# Patient Record
(demographics unavailable — no encounter records)

---

## 2024-10-29 NOTE — PHYSICAL EXAM
[No Acute Distress] : no acute distress [Normal Sclera/Conjunctiva] : normal sclera/conjunctiva [Normal Outer Ear/Nose] : the outer ears and nose were normal in appearance [No JVD] : no jugular venous distention [Normal] : normal rate, regular rhythm, normal S1 and S2 and no murmur heard [No Edema] : there was no peripheral edema [No Rash] : no rash [No Focal Deficits] : no focal deficits

## 2024-11-03 NOTE — END OF VISIT
[] : Resident [FreeTextEntry3] : pt to f/u with pulm for sleep study. insomnia mgmt as above. cough less likely related to pt pulm hx, will address GERD/post nasal drip related causes of cough. pt seen by ED and UC for cough and given extensive meds, pt counseled to hold/discontinue steroids.  [Time Spent: ___ minutes] : I have spent [unfilled] minutes of time on the encounter which excludes teaching and separately reported services.

## 2024-11-03 NOTE — HISTORY OF PRESENT ILLNESS
[FreeTextEntry8] : 39F pmhx seizures requiring intubation and trach s/p decannulation (2011) presents with 3 weeks of insomnia and 1 week of cough. Patient is most worried about the insomnia. For insomnia, patient has had difficulty with sleep onset x3 days. No known triggering factor. Patient tries to sleep at 10pm but is unable to sleep for several hours and wakes up at 5 or 6am. States she sleeps 3 hours per night and takes a 3 hour nap during the day while her children are at school. States she avoids stimulating activities such as phone and blue light prior to bed. Inquires about sleep apnea; states she has day time somnolence and snoring, but denies observed periods of apnea. For cough, patient reports cough is nonproductive. No reported fevers. Went to ED 10/25, where RVP and CXR were negative. Went to urgent care prior to clinic visit and picked up prednisone, azithro, tessalon perles, zofran, and flonase from pharmacy as prescribed by urgent care. Overall, cough has been improving.

## 2024-11-03 NOTE — ASSESSMENT
[FreeTextEntry1] : 39F pmhx seizures requirng intubation and trach s/p decannulation (2011) presents with 3 weeks of insomnia and 1 week of cough.  D/w Dr. Holden  RTC w/ PCP DEBBIE Nava 4weeks for insomnia  Rafael Vargas PGY-2 [employer]

## 2024-11-03 NOTE — REVIEW OF SYSTEMS
[Cough] : cough [Fever] : no fever [Chest Pain] : no chest pain [Shortness Of Breath] : no shortness of breath [Wheezing] : no wheezing

## 2024-12-02 NOTE — HISTORY OF PRESENT ILLNESS
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 Pt endorsed new onset headache last Monday 11/4 that worsened throughout the week, until Thursday when she came into the ED. Endorses 10/10 pain. Additionally, pt was experiencing vomiting and nausea. Pt was worked up and was dc from ED with Trazdone, Zolpidem, and Zofran PRNs for sleep and nausea. On Thursday after dc from ED, pt started experiencing worsening R eye swelling, vision blurriness, and rash. Pt returned to the ED yesterday fro worsening symptoms. Pt is no longer vomitting, but endorses continued headache and eye symptoms. Pt took acetaminophen and tylenol for pain management, to no marked effect. Endorses persistent loss of appetite. Pt is sexually active with one lifetime partner. Pt denies dysuria, hematuria, GI/ symptoms.   PER ED NOTE -- 39-year-old female distant history of encephalitis, no other medical history, presents to the ER with pain of the right eye swelling of the right upper eyelid and a rash developing around that area as well. No fevers or chills. She has had a persistent headache for over a week now. She was in the ER 3 days ago where the rash and swelling or eye complaint was not present. At that time she had blood work done and a CT head which was negative. She was discharged on zolpidem to help with her sleep tramadol for pain as well as Zofran for nausea. She was vomiting last week with a headache but she has stopped vomiting and she only has nausea now. She wears glasses at baseline. No loss of visual acuity. No sick contacts at home. Childhood vaccines are up-to-date. No confusion. (11 Nov 2024 12:48)  Hospital Course: Over course of hospitalization, pt's right eye swelling, vesicular rash, skin erythema, and amount of eye discharge improved significantly. However, patient's eye was swollen shut for entire hospitalization. Eye redness also moderately improved. Patient endorsed blurry vision from right eye that improved by the day of discharge. Pt endorsed some anxiety related to symptoms of mild tearing, swelling, and itchiness in from her left eye. However, there was never any clinically significant signs that infections had spread to her left eye. At points, pt also endorsed chest pain that improved without treatment, and throat pain present when swallowing, but not present when eating. On day of dc, vesicular rash had fully crusted over. Right eye was still swollen shut, but swelling had markedly decreased, right eye itself was still moderately erythematous.  The patient is afebrile, hemodynamically stable and medically optimized for discharge home, to follow-up with ophthalmology. On day of discharge, patient is clinically stable with no new exam findings or acute symptoms compared to prior. The patient was seen by the attending physician on the date of discharge and deemed stable and acceptable for discharge. The patient's medication reconciliation (with changes made to chronic medications), follow up appointments, discharge orders, instructions, and significant lab and diagnostic studies are as noted.  Important Medication Changes and Reason: None  Active or Pending Issues Requiring Follow-up: None  Advanced Directives: [X] Full code [ ] DNR [ ] Hospice  Discharge Diagnoses: Disseminated Herpes Zoster Ophthalmicus  Med Reconciliation: Override IMPROVE-DD recommendations due to: IMPROVE-DD Application Not Available Recommended Post-Discharge VTE Prophylaxis IMPROVE-DD Application Not Available Medication Reconciliation Status Admission Reconciliation is Completed Discharge Reconciliation is Completed  Discharge Medications erythromycin 0.5% ophthalmic ointment: 1 application to each affected eye 2 times a day meloxicam 10 mg oral capsule: 1 cap(s) orally once a day ocular lubricant ophthalmic solution: 1 drop(s) to each affected eye every 4 hours valACYclovir 1 g oral tablet: 1 tab(s) orally every 8 hours   Pt is a 40 y/o old female w/PMHx of seizure and prior intubation and tracheostomy in 2011, Ventral hernia (following surgery) who presents today for a post hospital follow up.  Patient was admitted to [facility] for Disseminated Herpes Zoster Ophthalmicus. s/p treatment.  Today the patient reports that she still has pain in her right eye. She has completed the course of Valacyclovir. Patient has an appointment with the ophthalmology next week.   # Insomnia- Patient also reports that she has trouble falling asleep. She denies anxiety/depression/denies any stress, she denies taking daytime nap, denies screen time at night. She has tried melatonin, but it does not help. Will provide CBT referral today.

## 2024-12-02 NOTE — PHYSICAL EXAM
[Normal] : no acute distress, well nourished, well developed and well-appearing [No Respiratory Distress] : no respiratory distress  [No Accessory Muscle Use] : no accessory muscle use [Clear to Auscultation] : lungs were clear to auscultation bilaterally [Normal Rate] : normal rate  [Regular Rhythm] : with a regular rhythm [Normal S1, S2] : normal S1 and S2 [de-identified] : unable to open right eye

## 2024-12-02 NOTE — HISTORY OF PRESENT ILLNESS
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 Pt endorsed new onset headache last Monday 11/4 that worsened throughout the week, until Thursday when she came into the ED. Endorses 10/10 pain. Additionally, pt was experiencing vomiting and nausea. Pt was worked up and was dc from ED with Trazdone, Zolpidem, and Zofran PRNs for sleep and nausea. On Thursday after dc from ED, pt started experiencing worsening R eye swelling, vision blurriness, and rash. Pt returned to the ED yesterday fro worsening symptoms. Pt is no longer vomitting, but endorses continued headache and eye symptoms. Pt took acetaminophen and tylenol for pain management, to no marked effect. Endorses persistent loss of appetite. Pt is sexually active with one lifetime partner. Pt denies dysuria, hematuria, GI/ symptoms.   PER ED NOTE -- 39-year-old female distant history of encephalitis, no other medical history, presents to the ER with pain of the right eye swelling of the right upper eyelid and a rash developing around that area as well. No fevers or chills. She has had a persistent headache for over a week now. She was in the ER 3 days ago where the rash and swelling or eye complaint was not present. At that time she had blood work done and a CT head which was negative. She was discharged on zolpidem to help with her sleep tramadol for pain as well as Zofran for nausea. She was vomiting last week with a headache but she has stopped vomiting and she only has nausea now. She wears glasses at baseline. No loss of visual acuity. No sick contacts at home. Childhood vaccines are up-to-date. No confusion. (11 Nov 2024 12:48)  Hospital Course: Over course of hospitalization, pt's right eye swelling, vesicular rash, skin erythema, and amount of eye discharge improved significantly. However, patient's eye was swollen shut for entire hospitalization. Eye redness also moderately improved. Patient endorsed blurry vision from right eye that improved by the day of discharge. Pt endorsed some anxiety related to symptoms of mild tearing, swelling, and itchiness in from her left eye. However, there was never any clinically significant signs that infections had spread to her left eye. At points, pt also endorsed chest pain that improved without treatment, and throat pain present when swallowing, but not present when eating. On day of dc, vesicular rash had fully crusted over. Right eye was still swollen shut, but swelling had markedly decreased, right eye itself was still moderately erythematous.  The patient is afebrile, hemodynamically stable and medically optimized for discharge home, to follow-up with ophthalmology. On day of discharge, patient is clinically stable with no new exam findings or acute symptoms compared to prior. The patient was seen by the attending physician on the date of discharge and deemed stable and acceptable for discharge. The patient's medication reconciliation (with changes made to chronic medications), follow up appointments, discharge orders, instructions, and significant lab and diagnostic studies are as noted.  Important Medication Changes and Reason: None  Active or Pending Issues Requiring Follow-up: None  Advanced Directives: [X] Full code [ ] DNR [ ] Hospice  Discharge Diagnoses: Disseminated Herpes Zoster Ophthalmicus  Med Reconciliation: Override IMPROVE-DD recommendations due to: IMPROVE-DD Application Not Available Recommended Post-Discharge VTE Prophylaxis IMPROVE-DD Application Not Available Medication Reconciliation Status Admission Reconciliation is Completed Discharge Reconciliation is Completed  Discharge Medications erythromycin 0.5% ophthalmic ointment: 1 application to each affected eye 2 times a day meloxicam 10 mg oral capsule: 1 cap(s) orally once a day ocular lubricant ophthalmic solution: 1 drop(s) to each affected eye every 4 hours valACYclovir 1 g oral tablet: 1 tab(s) orally every 8 hours   Pt is a 40 y/o old female w/PMHx of seizure and prior intubation and tracheostomy in 2011, Ventral hernia (following surgery) who presents today for a post hospital follow up.  Patient was admitted to [facility] for Disseminated Herpes Zoster Ophthalmicus. s/p treatment.  Today the patient reports that she still has pain in her right eye. She has completed the course of Valacyclovir. Patient has an appointment with the ophthalmology next week.   # Insomnia- Patient also reports that she has trouble falling asleep. She denies anxiety/depression/denies any stress, she denies taking daytime nap, denies screen time at night. She has tried melatonin, but it does not help. Will provide CBT referral today.

## 2024-12-02 NOTE — PHYSICAL EXAM
[Normal] : no acute distress, well nourished, well developed and well-appearing [No Respiratory Distress] : no respiratory distress  [No Accessory Muscle Use] : no accessory muscle use [Clear to Auscultation] : lungs were clear to auscultation bilaterally [Normal Rate] : normal rate  [Regular Rhythm] : with a regular rhythm [Normal S1, S2] : normal S1 and S2 [de-identified] : unable to open right eye

## 2024-12-17 NOTE — HISTORY OF PRESENT ILLNESS
[FreeTextEntry1] : 38 yo F presents for initial evaluation of acute insomnia. Referred by NP Dale Nava PMH: unspecified possible viral encephalitis in 2011 at [facility] with prolonged AMS and trach/decannulated Sleep history: In the past sleep from 12 am to 7 am, no issues falling asleep or staying asleep, takes kid to school at 9 am.  Insomnia for the past 1.5 months. Denies acute stressors and can not identify a precipitating factor. denies previous insomnia episodes. Now gets into bed at 10 pm, tired but can not sleep for hours, listens to relaxing music, counting, laying in bed the whole time, will be awake until 5 am.  Snores, denies gasping episodes.  Has tried to nap during the day, has difficulty. ESS 3 Has tried Melatonin, Unisom and Tylenol PM- didn't help.  4 children 18, 16, 13, and 7 Manages the children primarily as her  works during the day  Recent hospitalization for herpes ophthalmicus - discharged last month 11/16. Continues to have pain over her right eye/periorbital/face. Prescribed Gabapentin 300 mg and Duloxetine - not taking either as concerned about potential side effects.   The uncontrolled pain has made her insomnia worse.

## 2024-12-17 NOTE — ASSESSMENT
[FreeTextEntry1] : 38 yo F presents for initial evaluation of acute insomnia. Referred by NP Dale Nava PMH: unspecified possible viral encephalitis in 2011 at [facility] with prolonged AMS and trach/decannulated Sleep history: In the past sleep from 12 am to 7 am, no issues falling asleep or staying asleep, takes kid to school at 9 am. Insomnia for the past 1.5 months. Denies acute stressors and can not identify a precipitating factor. denies previous insomnia episodes. Now gets into bed at 10 pm, tired but can not sleep for hours, listens to relaxing music, counting, laying in bed the whole time, will be awake until 5 am. Snores, denies gasping episodes. Has tried to nap during the day, has difficulty. ESS 3 Has tried Melatonin, Unisom and Tylenol PM- didn't help. 4 children 18, 16, 13, and 7 Manages the children primarily as her  works during the day  Recent hospitalization for herpes ophthalmicus - discharged last month 11/16. Continues to have pain over her right eye/periorbital/face. Prescribed Gabapentin 300 mg and Duloxetine - not taking either as concerned about potential side effects. The uncontrolled pain has made her insomnia worse.  A/Plan: Acute insomnia, unclear precipitator, made worse by recent hospitalization and uncontrolled neuropathic pain Discussed Gabapentin use and general safety profile as well as the fact that if her pain is controlled, she will sleep better and Gabapentin can have sedating effects as well that can promote sleep. Discussed starting at a lower dose (100 mg) at bedtime to trial and see how she feels. She will consider this As an alternative, discussed initiating low dose Zolpidem 5 mg. Denies a history of parasomnias. She is amenable to this.  Instructed not to combine with gabapentin, alcohol or to perform complex activities when she is taking the medication.  Instructed to take about 30 minutes prior to going to bed.  She appears to understand.  Rx sent to her pharmacy. Follow-up in 3 to 4 weeks

## 2024-12-17 NOTE — ASSESSMENT
[FreeTextEntry1] : 38 yo F presents for initial evaluation of acute insomnia. Referred by NP Dale Nava PMH: unspecified possible viral encephalitis in 2011 at [facility] with prolonged AMS and trach/decannulated Sleep history: In the past sleep from 12 am to 7 am, no issues falling asleep or staying asleep, takes kid to school at 9 am. Insomnia for the past 1.5 months. Denies acute stressors and can not identify a precipitating factor. denies previous insomnia episodes. Now gets into bed at 10 pm, tired but can not sleep for hours, listens to relaxing music, counting, laying in bed the whole time, will be awake until 5 am. Snores, denies gasping episodes. Has tried to nap during the day, has difficulty. ESS 3 Has tried Melatonin, Unisom and Tylenol PM- didn't help. 4 children 18, 16, 13, and 7 Manages the children primarily as her  works during the day  Recent hospitalization for herpes ophthalmicus - discharged last month 11/16. Continues to have pain over her right eye/periorbital/face. Prescribed Gabapentin 300 mg and Duloxetine - not taking either as concerned about potential side effects. The uncontrolled pain has made her insomnia worse.  A/Plan: Acute insomnia, unclear precipitator, made worse by recent hospitalization and uncontrolled neuropathic pain Discussed Gabapentin use and general safety profile as well as the fact that if her pain is controlled, she will sleep better and Gabapentin can have sedating effects as well that can promote sleep. Discussed starting at a lower dose (100 mg) at bedtime to trial and see how she feels. She will consider this As an alternative, discussed initiating low dose Zolpidem 5 mg. Denies a history of parasomnias. She is amenable to this.  Instructed not to combine with gabapentin, alcohol or to perform complex activities when she is taking the medication.  Instructed to take about 30 minutes prior to going to bed.  She appears to understand.  Rx sent to her pharmacy. Follow-up in 3 to 4 weeks

## 2024-12-17 NOTE — REVIEW OF SYSTEMS
[Snoring] : snoring [Witnessed Apneas] : no witnessed apnea [Nocturia] : no nocturia [Difficulty Initiating Sleep] : difficulty falling asleep [Difficulty Maintaining Sleep] : no difficulty maintaining sleep [Acute Insomnia] : acute insomnia [Lower Extremity Discomfort] : no lower extremity discomfort [Unusual Sleep Behavior] : no unusual sleep behavior [Hypersomnolence] : not sleeping much more than usual [Negative] : Endocrine [FreeTextEntry3] : per hpi [de-identified] : per hpi

## 2024-12-17 NOTE — HISTORY OF PRESENT ILLNESS
[FreeTextEntry1] : 40 yo F presents for initial evaluation of acute insomnia. Referred by NP Dale Nava PMH: unspecified possible viral encephalitis in 2011 at [facility] with prolonged AMS and trach/decannulated Sleep history: In the past sleep from 12 am to 7 am, no issues falling asleep or staying asleep, takes kid to school at 9 am.  Insomnia for the past 1.5 months. Denies acute stressors and can not identify a precipitating factor. denies previous insomnia episodes. Now gets into bed at 10 pm, tired but can not sleep for hours, listens to relaxing music, counting, laying in bed the whole time, will be awake until 5 am.  Snores, denies gasping episodes.  Has tried to nap during the day, has difficulty. ESS 3 Has tried Melatonin, Unisom and Tylenol PM- didn't help.  4 children 18, 16, 13, and 7 Manages the children primarily as her  works during the day  Recent hospitalization for herpes ophthalmicus - discharged last month 11/16. Continues to have pain over her right eye/periorbital/face. Prescribed Gabapentin 300 mg and Duloxetine - not taking either as concerned about potential side effects.   The uncontrolled pain has made her insomnia worse.

## 2024-12-17 NOTE — PHYSICAL EXAM
[General Appearance - Well Developed] : well developed [Normal Appearance] : normal appearance [Well Groomed] : well groomed [General Appearance - Well Nourished] : well nourished [No Deformities] : no deformities [General Appearance - In No Acute Distress] : no acute distress [Normal Conjunctiva] : the conjunctiva exhibited no abnormalities [Eyelids - No Xanthelasma] : the eyelids demonstrated no xanthelasmas [Low Lying Soft Palate] : low lying soft palate [II] : II [Neck Appearance] : the appearance of the neck was normal [Heart Rate And Rhythm] : heart rate was normal and rhythm regular [Heart Sounds] : normal S1 and S2 [] : no respiratory distress [Respiration, Rhythm And Depth] : normal respiratory rhythm and effort [Auscultation Breath Sounds / Voice Sounds] : lungs were clear to auscultation bilaterally [Motor Tone] : muscle strength and tone were normal [Nail Clubbing] : no clubbing of the fingernails [Cyanosis, Localized] : no localized cyanosis [Skin Color & Pigmentation] : normal skin color and pigmentation [Oriented To Time, Place, And Person] : oriented to person, place, and time [Impaired Insight] : insight and judgment were intact

## 2024-12-17 NOTE — REVIEW OF SYSTEMS
[Snoring] : snoring [Witnessed Apneas] : no witnessed apnea [Nocturia] : no nocturia [Difficulty Initiating Sleep] : difficulty falling asleep [Difficulty Maintaining Sleep] : no difficulty maintaining sleep [Acute Insomnia] : acute insomnia [Lower Extremity Discomfort] : no lower extremity discomfort [Unusual Sleep Behavior] : no unusual sleep behavior [Hypersomnolence] : not sleeping much more than usual [Negative] : Endocrine [FreeTextEntry3] : per hpi [de-identified] : per hpi

## 2024-12-17 NOTE — CONSULT LETTER
[Dear  ___] : Dear ~OVIDIO, [Consult Letter:] : I had the pleasure of evaluating your patient, [unfilled]. [Please see my note below.] : Please see my note below. [Consult Closing:] : Thank you very much for allowing me to participate in the care of this patient.  If you have any questions, please do not hesitate to contact me. [Sincerely,] : Sincerely, [FreeTextEntry2] : DEBBIE Nava [FreeTextEntry3] : Kassandra Macedo MD, FAASM  of Medicine Associate , Fellowship in Pulmonary and Critical Care Medicine Division of Pulmonary, Critical Care & Sleep Medicine [employer] [facility] [facility] [employer] [facility]

## 2025-01-16 NOTE — ASSESSMENT
[FreeTextEntry1] : 40 yo F with chronic insomnia, recent herpes ophthalmicus with significant residual neuropathic pain who presents for follow up. Plan: As she continues to have significant eye discomfort at night, will increase Gabapentin to 400 mg and can increase to max of 600 mg. Rx for 100 mg tabs sent.  Does not wish to take Gabapentin during the day Instructed not to combine Gabapentin with Zolpidem. Follow up in 6 weeks or earlier as needed

## 2025-01-16 NOTE — ASSESSMENT
[FreeTextEntry1] : 38 yo F with chronic insomnia, recent herpes ophthalmicus with significant residual neuropathic pain who presents for follow up. Plan: As she continues to have significant eye discomfort at night, will increase Gabapentin to 400 mg and can increase to max of 600 mg. Rx for 100 mg tabs sent.  Does not wish to take Gabapentin during the day Instructed not to combine Gabapentin with Zolpidem. Follow up in 6 weeks or earlier as needed

## 2025-01-16 NOTE — HISTORY OF PRESENT ILLNESS
[FreeTextEntry1] : 38 yo F with chronic insomnia presents for follow up. 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 Referred by DEBBIE Nava Sleep history: In the past sleep from 12 am to 7 am, no issues falling asleep or staying asleep, takes kid to school at 9 am.  Insomnia for the past 1.5 months. Denies acute stressors and can not identify a precipitating factor. denies previous insomnia episodes. Now gets into bed at 10 pm, tired but can not sleep for hours, listens to relaxing music, counting, laying in bed the whole time, will be awake until 5 am.  Snores, denies gasping episodes.  Has tried to nap during the day, has difficulty. ESS 3 Has tried Melatonin, Unisom and Tylenol PM- didn't help.  4 children 18, 16, 13, and 7 Manages the children primarily as her  works during the day  Recent hospitalization for herpes ophthalmicus - discharged last month 11/16. Continues to have pain over her right eye/periorbital/face. Prescribed Gabapentin 300 mg and Duloxetine - not taking either as concerned about potential side effects.   The uncontrolled pain has made her insomnia worse.   Follow up televisit 1/16/25: At last visit was started on Zolpidem 7.5 mg at bedtime (after updating me that she had taken Ambien 5 mg in the past without benefit)- did not start as she continues to use Gabapentin Using Gabapentin 300 mg at 8-9 pm, falls asleep around 10-11 pm - worked for pain and helped her sleep, then after a few nights unable to sleep or in a very light sleep. Using for about 1 month

## 2025-01-16 NOTE — REASON FOR VISIT
[Home] : at home, [unfilled] , at the time of the visit. [Medical Office: ([facility])___] : at the medical office located in  [Follow-Up] : a follow-up visit [FreeTextEntry2] : Chronic insomnia

## 2025-01-16 NOTE — PHYSICAL EXAM
[General Appearance - Well Developed] : well developed [Normal Appearance] : normal appearance [Well Groomed] : well groomed [General Appearance - Well Nourished] : well nourished [No Deformities] : no deformities [General Appearance - In No Acute Distress] : no acute distress [Normal Conjunctiva] : the conjunctiva exhibited no abnormalities [Eyelids - No Xanthelasma] : the eyelids demonstrated no xanthelasmas [Normal Oropharynx] : normal oropharynx [FreeTextEntry1] : healed tracheostomy site [Heart Rate And Rhythm] : heart rate was normal and rhythm regular [Heart Sounds] : normal S1 and S2 [] : no respiratory distress [Respiration, Rhythm And Depth] : normal respiratory rhythm and effort [Auscultation Breath Sounds / Voice Sounds] : lungs were clear to auscultation bilaterally [Nail Clubbing] : no clubbing of the fingernails [Cyanosis, Localized] : no localized cyanosis [Oriented To Time, Place, And Person] : oriented to person, place, and time [Impaired Insight] : insight and judgment were intact

## 2025-01-16 NOTE — REASON FOR VISIT
[Home] : at home, [unfilled] , at the time of the visit. [Medical Office: ([facility])___] : at the medical office located in  [Follow-Up] : a follow-up visit [FreeTextEntry2] : Chronic insomnia

## 2025-01-16 NOTE — HISTORY OF PRESENT ILLNESS
[FreeTextEntry1] : 38 yo F with chronic insomnia presents for follow up. PMH: unspecified possible viral encephalitis in 2011 at [facility] with prolonged AMS and trach/decannulated, herpes ophthalmicus  12/16/24: Initial evaluation of acute insomnia. Referred by DEBBIE Nava Sleep history: In the past sleep from 12 am to 7 am, no issues falling asleep or staying asleep, takes kid to school at 9 am.  Insomnia for the past 1.5 months. Denies acute stressors and can not identify a precipitating factor. denies previous insomnia episodes. Now gets into bed at 10 pm, tired but can not sleep for hours, listens to relaxing music, counting, laying in bed the whole time, will be awake until 5 am.  Snores, denies gasping episodes.  Has tried to nap during the day, has difficulty. ESS 3 Has tried Melatonin, Unisom and Tylenol PM- didn't help.  4 children 18, 16, 13, and 7 Manages the children primarily as her  works during the day  Recent hospitalization for herpes ophthalmicus - discharged last month 11/16. Continues to have pain over her right eye/periorbital/face. Prescribed Gabapentin 300 mg and Duloxetine - not taking either as concerned about potential side effects.   The uncontrolled pain has made her insomnia worse.   Follow up televisit 1/16/25: At last visit was started on Zolpidem 7.5 mg at bedtime (after updating me that she had taken Ambien 5 mg in the past without benefit)- did not start as she continues to use Gabapentin Using Gabapentin 300 mg at 8-9 pm, falls asleep around 10-11 pm - worked for pain and helped her sleep, then after a few nights unable to sleep or in a very light sleep. Using for about 1 month

## 2025-01-16 NOTE — REVIEW OF SYSTEMS
[Snoring] : snoring [Difficulty Initiating Sleep] : difficulty falling asleep [Acute Insomnia] : acute insomnia [Negative] : Endocrine [Witnessed Apneas] : no witnessed apnea [Nocturia] : no nocturia [Difficulty Maintaining Sleep] : no difficulty maintaining sleep [Lower Extremity Discomfort] : no lower extremity discomfort [Unusual Sleep Behavior] : no unusual sleep behavior [Hypersomnolence] : not sleeping much more than usual [FreeTextEntry3] : per hpi [de-identified] : per hpi

## 2025-01-16 NOTE — REVIEW OF SYSTEMS
[Snoring] : snoring [Difficulty Initiating Sleep] : difficulty falling asleep [Acute Insomnia] : acute insomnia [Negative] : Endocrine [Witnessed Apneas] : no witnessed apnea [Nocturia] : no nocturia [Difficulty Maintaining Sleep] : no difficulty maintaining sleep [Lower Extremity Discomfort] : no lower extremity discomfort [Unusual Sleep Behavior] : no unusual sleep behavior [Hypersomnolence] : not sleeping much more than usual [FreeTextEntry3] : per hpi [de-identified] : per hpi

## 2025-03-13 NOTE — HISTORY OF PRESENT ILLNESS
[FreeTextEntry1] : 38 yo F with chronic insomnia presents for follow up. 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 Referred by DEBBIE Nava Sleep history: In the past sleep from 12 am to 7 am, no issues falling asleep or staying asleep, takes kid to school at 9 am.  Insomnia for the past 1.5 months. Denies acute stressors and can not identify a precipitating factor. denies previous insomnia episodes. Now gets into bed at 10 pm, tired but can not sleep for hours, listens to relaxing music, counting, laying in bed the whole time, will be awake until 5 am.  Snores, denies gasping episodes.  Has tried to nap during the day, has difficulty. ESS 3 Has tried Melatonin, Unisom and Tylenol PM- didn't help.  4 children 18, 16, 13, and 7 Manages the children primarily as her  works during the day  Recent hospitalization for herpes ophthalmicus - discharged last month 11/16. Continues to have pain over her right eye/periorbital/face. Prescribed Gabapentin 300 mg and Duloxetine - not taking either as concerned about potential side effects.   The uncontrolled pain has made her insomnia worse.   Follow up televisit 1/16/25: At last visit was started on Zolpidem 7.5 mg at bedtime (after updating me that she had taken Ambien 5 mg in the past without benefit)- did not start as she continues to use Gabapentin Using Gabapentin 300 mg at 8-9 pm, falls asleep around 10-11 pm - worked for pain and helped her sleep, then after a few nights unable to sleep or in a very light sleep. Using for about 1 month  Follow up OV 3/12/25:  States the eye pain is much improved. Continues to take Gabapentin 400 mg at bedtime which helps her sleep.  Continues to have difficulty initiating sleep when she does not take Gabapentin. Feels tired around 8 pm and puts her 7 year old to bed at at 8:30 but when she goes to her bed can not fall asleep until much later, often taking Gabapentin to help her sleep close to 12 am. Up at 6 am, tired during the day, will nap 1 hour

## 2025-03-13 NOTE — REVIEW OF SYSTEMS
[Snoring] : snoring [Difficulty Initiating Sleep] : difficulty falling asleep [Acute Insomnia] : acute insomnia [Negative] : Endocrine [Witnessed Apneas] : no witnessed apnea [Nocturia] : no nocturia [Difficulty Maintaining Sleep] : no difficulty maintaining sleep [Lower Extremity Discomfort] : no lower extremity discomfort [Unusual Sleep Behavior] : no unusual sleep behavior [Hypersomnolence] : not sleeping much more than usual [FreeTextEntry3] : per hpi [de-identified] : per hpi

## 2025-03-13 NOTE — ASSESSMENT
[FreeTextEntry1] : 40 yo F with chronic insomnia, recent herpes ophthalmicus with significant residual neuropathic pain which has now resolved who presents for follow up. Plan: Continues to have sleep initiation insomnia despite resolution of pain. Reviewed sleep hygiene and sleep restriction techniques Will stop Gabapentin and start Ambien 7.5 mg (did not respond to 5 mg in the past)- rx sent Provided with CBTi referral to Dr. Tessy Kramer and I will also request from [facility] CBT program Follow up in 6 weeks

## 2025-03-13 NOTE — PHYSICAL EXAM
[General Appearance - Well Developed] : well developed [Normal Appearance] : normal appearance [Well Groomed] : well groomed [General Appearance - Well Nourished] : well nourished [No Deformities] : no deformities [General Appearance - In No Acute Distress] : no acute distress [Normal Conjunctiva] : the conjunctiva exhibited no abnormalities [Eyelids - No Xanthelasma] : the eyelids demonstrated no xanthelasmas [Normal Oropharynx] : normal oropharynx [Heart Rate And Rhythm] : heart rate was normal and rhythm regular [Heart Sounds] : normal S1 and S2 [] : no respiratory distress [Respiration, Rhythm And Depth] : normal respiratory rhythm and effort [Auscultation Breath Sounds / Voice Sounds] : lungs were clear to auscultation bilaterally [Nail Clubbing] : no clubbing of the fingernails [Cyanosis, Localized] : no localized cyanosis [Oriented To Time, Place, And Person] : oriented to person, place, and time [Impaired Insight] : insight and judgment were intact [FreeTextEntry1] : healed tracheostomy site

## 2025-03-13 NOTE — REVIEW OF SYSTEMS
[Snoring] : snoring [Difficulty Initiating Sleep] : difficulty falling asleep [Acute Insomnia] : acute insomnia [Negative] : Endocrine [Witnessed Apneas] : no witnessed apnea [Nocturia] : no nocturia [Difficulty Maintaining Sleep] : no difficulty maintaining sleep [Lower Extremity Discomfort] : no lower extremity discomfort [Unusual Sleep Behavior] : no unusual sleep behavior [Hypersomnolence] : not sleeping much more than usual [FreeTextEntry3] : per hpi [de-identified] : per hpi

## 2025-03-13 NOTE — ASSESSMENT
[FreeTextEntry1] : 38 yo F with chronic insomnia, recent herpes ophthalmicus with significant residual neuropathic pain which has now resolved who presents for follow up. Plan: Continues to have sleep initiation insomnia despite resolution of pain. Reviewed sleep hygiene and sleep restriction techniques Will stop Gabapentin and start Ambien 7.5 mg (did not respond to 5 mg in the past)- rx sent Provided with CBTi referral to Dr. Tessy Kramer and I will also request from [facility] CBT program Follow up in 6 weeks

## 2025-05-02 NOTE — PHYSICAL EXAM
[No Acute Distress] : no acute distress [Normal Oropharynx] : normal oropharynx [Normal Appearance] : normal appearance [Normal Rate/Rhythm] : normal rate/rhythm [Normal S1, S2] : normal s1, s2 [No Murmurs] : no murmurs [No Resp Distress] : no resp distress [Clear to Auscultation Bilaterally] : clear to auscultation bilaterally [No Abnormalities] : no abnormalities [No Clubbing] : no clubbing [No Cyanosis] : no cyanosis [No Edema] : no edema [Normal Color/ Pigmentation] : normal color/ pigmentation [No Focal Deficits] : no focal deficits [Oriented x3] : oriented x3 [Normal Affect] : normal affect

## 2025-05-08 NOTE — HISTORY OF PRESENT ILLNESS
[Never] : never [TextBox_4] : 41 yo F with chronic insomnia presents for follow up. 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 Referred by DEBBIE Nava Sleep history: In the past sleep from 12 am to 7 am, no issues falling asleep or staying asleep, takes kid to school at 9 am. Insomnia for the past 1.5 months. Denies acute stressors and can not identify a precipitating factor. denies previous insomnia episodes. Now gets into bed at 10 pm, tired but can not sleep for hours, listens to relaxing music, counting, laying in bed the whole time, will be awake until 5 am. Snores, denies gasping episodes. Has tried to nap during the day, has difficulty. ESS 3 Has tried Melatonin, Unisom and Tylenol PM- didn't help. 4 children 18, 16, 13, and 7 Manages the children primarily as her  works during the day  Recent hospitalization for herpes ophthalmicus - discharged last month 11/16. Continues to have pain over her right eye/periorbital/face. Prescribed Gabapentin 300 mg and Duloxetine - not taking either as concerned about potential side effects. The uncontrolled pain has made her insomnia worse.  Follow up televisit 1/16/25: At last visit was started on Zolpidem 7.5 mg at bedtime (after updating me that she had taken Ambien 5 mg in the past without benefit)- did not start as she continues to use Gabapentin Using Gabapentin 300 mg at 8-9 pm, falls asleep around 10-11 pm - worked for pain and helped her sleep, then after a few nights unable to sleep or in a very light sleep. Using for about 1 month  Follow up OV 3/12/25: States the eye pain is much improved. Continues to take Gabapentin 400 mg at bedtime which helps her sleep. Continues to have difficulty initiating sleep when she does not take Gabapentin. Feels tired around 8 pm and puts her 7 year old to bed at at 8:30 but when she goes to her bed can not fall asleep until much later, often taking Gabapentin to help her sleep close to 12 am. Up at 6 am, tired during the day, will nap 1 hour  Follow up OV 5/2/25:  Goes to bed at 10pm  Feels like she has her eyes closed but feels awake still until 3-4am  She takes the ambien 7.5mg every night at 8pm. She feels like it helps on some nights, but inconsistently  wakes up at 5am  Feels poorly rested in the morning, and has daytime somnolence. + snoring.  Tried setting up CBTi with [facility] group- however was told they weren't taking new patients. States Dr. Kramer  was too far but didnt ask about televisits.

## 2025-05-08 NOTE — ASSESSMENT
[FreeTextEntry1] : 40 yo F with chronic insomnia, recent herpes ophthalmicus with significant residual neuropathic pain which has now resolved who presents for follow up.  Plan: Continues to have sleep initiation insomnia  Reviewed sleep hygiene and sleep restriction techniques not responding well to ambien - will change to doxopin 3mg Ekg completed - QTC wnl Provided with new CBT referral- will reach out to Dr. Kramer again  Follow up in 6 weeks.

## 2025-05-08 NOTE — ASSESSMENT
[FreeTextEntry1] : 38 yo F with chronic insomnia, recent herpes ophthalmicus with significant residual neuropathic pain which has now resolved who presents for follow up.  Plan: Continues to have sleep initiation insomnia  Reviewed sleep hygiene and sleep restriction techniques not responding well to ambien - will change to doxopin 3mg Ekg completed - QTC wnl Provided with new CBT referral- will reach out to Dr. Kramer again  Follow up in 6 weeks.

## 2025-05-08 NOTE — HISTORY OF PRESENT ILLNESS
[Never] : never [TextBox_4] : 39 yo F with chronic insomnia presents for follow up. 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 Referred by DEBBIE Nava Sleep history: In the past sleep from 12 am to 7 am, no issues falling asleep or staying asleep, takes kid to school at 9 am. Insomnia for the past 1.5 months. Denies acute stressors and can not identify a precipitating factor. denies previous insomnia episodes. Now gets into bed at 10 pm, tired but can not sleep for hours, listens to relaxing music, counting, laying in bed the whole time, will be awake until 5 am. Snores, denies gasping episodes. Has tried to nap during the day, has difficulty. ESS 3 Has tried Melatonin, Unisom and Tylenol PM- didn't help. 4 children 18, 16, 13, and 7 Manages the children primarily as her  works during the day  Recent hospitalization for herpes ophthalmicus - discharged last month 11/16. Continues to have pain over her right eye/periorbital/face. Prescribed Gabapentin 300 mg and Duloxetine - not taking either as concerned about potential side effects. The uncontrolled pain has made her insomnia worse.  Follow up televisit 1/16/25: At last visit was started on Zolpidem 7.5 mg at bedtime (after updating me that she had taken Ambien 5 mg in the past without benefit)- did not start as she continues to use Gabapentin Using Gabapentin 300 mg at 8-9 pm, falls asleep around 10-11 pm - worked for pain and helped her sleep, then after a few nights unable to sleep or in a very light sleep. Using for about 1 month  Follow up OV 3/12/25: States the eye pain is much improved. Continues to take Gabapentin 400 mg at bedtime which helps her sleep. Continues to have difficulty initiating sleep when she does not take Gabapentin. Feels tired around 8 pm and puts her 7 year old to bed at at 8:30 but when she goes to her bed can not fall asleep until much later, often taking Gabapentin to help her sleep close to 12 am. Up at 6 am, tired during the day, will nap 1 hour  Follow up OV 5/2/25:  Goes to bed at 10pm  Feels like she has her eyes closed but feels awake still until 3-4am  She takes the ambien 7.5mg every night at 8pm. She feels like it helps on some nights, but inconsistently  wakes up at 5am  Feels poorly rested in the morning, and has daytime somnolence. + snoring.  Tried setting up CBTi with [facility] group- however was told they weren't taking new patients. States Dr. Kramer  was too far but didnt ask about televisits.